# Patient Record
Sex: MALE | Race: WHITE | Employment: OTHER | URBAN - METROPOLITAN AREA
[De-identification: names, ages, dates, MRNs, and addresses within clinical notes are randomized per-mention and may not be internally consistent; named-entity substitution may affect disease eponyms.]

---

## 2020-08-11 ENCOUNTER — TELEPHONE (OUTPATIENT)
Dept: FAMILY MEDICINE CLINIC | Age: 85
End: 2020-08-11

## 2020-08-11 NOTE — TELEPHONE ENCOUNTER
Pt daughter called and was asking about coumadin and Afib with her  dad. She needs someone to call her to clarify some things.

## 2020-08-11 NOTE — TELEPHONE ENCOUNTER
She understands that, the pt is .  She is needing to talk to staff about A fib and coumadin in regards to her brother having same issues

## 2020-08-11 NOTE — TELEPHONE ENCOUNTER
At this time, we are unavailable to access his records as it has been since 2014 since he has passed away. My recommendation would be to contact [facility] to see if they can help.